# Patient Record
Sex: FEMALE | Race: WHITE | URBAN - METROPOLITAN AREA
[De-identification: names, ages, dates, MRNs, and addresses within clinical notes are randomized per-mention and may not be internally consistent; named-entity substitution may affect disease eponyms.]

---

## 2017-03-17 ENCOUNTER — TELEPHONE (OUTPATIENT)
Dept: FAMILY MEDICINE CLINIC | Age: 37
End: 2017-03-17

## 2017-03-17 NOTE — TELEPHONE ENCOUNTER
Pt called stating she needs medication for pink eye. She says she has 5 kids at home and is worried about it \"spreading like wild fire\" before she can get to a doctors.  Please advise

## 2017-03-17 NOTE — TELEPHONE ENCOUNTER
Call placed to patient, patient offered appt to be seen, appt declined. Will call back Monday if not better.